# Patient Record
Sex: MALE | Race: WHITE | NOT HISPANIC OR LATINO | Employment: FULL TIME | URBAN - METROPOLITAN AREA
[De-identification: names, ages, dates, MRNs, and addresses within clinical notes are randomized per-mention and may not be internally consistent; named-entity substitution may affect disease eponyms.]

---

## 2018-01-04 ENCOUNTER — HISTORICAL (OUTPATIENT)
Dept: RESPIRATORY THERAPY | Facility: HOSPITAL | Age: 53
End: 2018-01-04

## 2018-01-06 ENCOUNTER — HISTORICAL (OUTPATIENT)
Dept: RESPIRATORY THERAPY | Facility: HOSPITAL | Age: 53
End: 2018-01-06

## 2018-01-08 ENCOUNTER — HISTORICAL (OUTPATIENT)
Dept: RADIOLOGY | Facility: HOSPITAL | Age: 53
End: 2018-01-08

## 2019-03-22 ENCOUNTER — HISTORICAL (OUTPATIENT)
Dept: RADIOLOGY | Facility: HOSPITAL | Age: 54
End: 2019-03-22

## 2019-10-18 ENCOUNTER — HISTORICAL (OUTPATIENT)
Dept: RADIOLOGY | Facility: HOSPITAL | Age: 54
End: 2019-10-18

## 2019-10-18 LAB
ABS NEUT (OLG): 4.4 X10(3)/MCL (ref 1.5–6.9)
ALBUMIN SERPL-MCNC: 3.7 GM/DL (ref 3.4–5)
ALBUMIN/GLOB SERPL: 1 RATIO
ALP SERPL-CCNC: 64 UNIT/L (ref 30–113)
ALT SERPL-CCNC: 85 UNIT/L (ref 10–45)
AST SERPL-CCNC: 48 UNIT/L (ref 15–37)
BILIRUB SERPL-MCNC: 0.5 MG/DL (ref 0.1–0.9)
BILIRUBIN DIRECT+TOT PNL SERPL-MCNC: 0.1 MG/DL (ref 0–0.3)
BILIRUBIN DIRECT+TOT PNL SERPL-MCNC: 0.4 MG/DL
BUN SERPL-MCNC: 18 MG/DL (ref 10–20)
CALCIUM SERPL-MCNC: 9.2 MG/DL (ref 8–10.5)
CHLORIDE SERPL-SCNC: 103 MMOL/L (ref 100–108)
CO2 SERPL-SCNC: 29 MMOL/L (ref 21–35)
CREAT SERPL-MCNC: 1.92 MG/DL (ref 0.7–1.3)
ERYTHROCYTE [DISTWIDTH] IN BLOOD BY AUTOMATED COUNT: 12.9 % (ref 11.5–17)
GLOBULIN SER-MCNC: 3.7 GM/DL
GLUCOSE SERPL-MCNC: 90 MG/DL (ref 75–116)
HCT VFR BLD AUTO: 49.1 % (ref 42–52)
HGB BLD-MCNC: 15.8 GM/DL (ref 14–18)
MCH RBC QN AUTO: 31 PG (ref 27–34)
MCHC RBC AUTO-ENTMCNC: 32 GM/DL (ref 31–36)
MCV RBC AUTO: 96 FL (ref 80–99)
PLATELET # BLD AUTO: 368 X10(3)/MCL (ref 140–400)
PMV BLD AUTO: 8.9 FL (ref 6.8–10)
POTASSIUM SERPL-SCNC: 4.6 MMOL/L (ref 3.6–5.2)
PROT SERPL-MCNC: 7.4 GM/DL (ref 6.4–8.2)
PSA SERPL-MCNC: 1.64 NG/ML (ref 0–4)
RBC # BLD AUTO: 5.09 X10(6)/MCL (ref 4.7–6.1)
SODIUM SERPL-SCNC: 139 MMOL/L (ref 135–145)
URATE SERPL-MCNC: 4 MG/DL (ref 2.6–7.2)
WBC # SPEC AUTO: 6.8 X10(3)/MCL (ref 4.5–11.5)

## 2020-05-08 ENCOUNTER — HISTORICAL (OUTPATIENT)
Dept: RADIOLOGY | Facility: HOSPITAL | Age: 55
End: 2020-05-08

## 2020-10-05 ENCOUNTER — HISTORICAL (OUTPATIENT)
Dept: RADIOLOGY | Facility: HOSPITAL | Age: 55
End: 2020-10-05

## 2020-10-08 ENCOUNTER — HISTORICAL (OUTPATIENT)
Dept: ADMINISTRATIVE | Facility: HOSPITAL | Age: 55
End: 2020-10-08

## 2020-11-03 ENCOUNTER — HISTORICAL (OUTPATIENT)
Dept: ADMINISTRATIVE | Facility: HOSPITAL | Age: 55
End: 2020-11-03

## 2020-12-04 ENCOUNTER — HISTORICAL (OUTPATIENT)
Dept: WOUND CARE | Facility: HOSPITAL | Age: 55
End: 2020-12-04

## 2020-12-29 ENCOUNTER — HISTORICAL (OUTPATIENT)
Dept: LAB | Facility: HOSPITAL | Age: 55
End: 2020-12-29

## 2020-12-29 LAB — C DIFF INTERP: NORMAL

## 2021-07-09 ENCOUNTER — HISTORICAL (OUTPATIENT)
Dept: LAB | Facility: HOSPITAL | Age: 56
End: 2021-07-09

## 2021-07-09 LAB
ABS NEUT (OLG): 2.8 X10(3)/MCL (ref 1.5–6.9)
ALBUMIN SERPL-MCNC: 4.1 GM/DL (ref 3.5–5)
ALBUMIN/GLOB SERPL: 1.3 RATIO (ref 1.1–2)
ALP SERPL-CCNC: 71 UNIT/L (ref 40–150)
ALT SERPL-CCNC: 25 UNIT/L (ref 0–55)
APPEARANCE, UA: CLEAR
AST SERPL-CCNC: 17 UNIT/L (ref 5–34)
BACTERIA SPEC CULT: ABNORMAL /HPF
BASOPHILS # BLD AUTO: 0.1 X10(3)/MCL (ref 0–0.1)
BASOPHILS NFR BLD AUTO: 1 % (ref 0–1)
BILIRUB SERPL-MCNC: 0.9 MG/DL
BILIRUB UR QL STRIP: NEGATIVE
BILIRUBIN DIRECT+TOT PNL SERPL-MCNC: 0.3 MG/DL (ref 0–0.5)
BILIRUBIN DIRECT+TOT PNL SERPL-MCNC: 0.6 MG/DL (ref 0–0.8)
BUN SERPL-MCNC: 13 MG/DL (ref 8.4–25.7)
CALCIUM SERPL-MCNC: 9.5 MG/DL (ref 8.4–10.2)
CHLORIDE SERPL-SCNC: 107 MMOL/L (ref 98–107)
CHOLEST SERPL-MCNC: 209 MG/DL
CHOLEST/HDLC SERPL: 4 {RATIO} (ref 0–5)
CO2 SERPL-SCNC: 29 MMOL/L (ref 22–29)
COLOR UR: YELLOW
CREAT SERPL-MCNC: 1.36 MG/DL (ref 0.73–1.18)
CREAT UR-MCNC: 184.4 MG/DL (ref 58–161)
DEPRECATED CALCIDIOL+CALCIFEROL SERPL-MC: 31.8 NG/ML (ref 30–80)
EOSINOPHIL # BLD AUTO: 0.2 X10(3)/MCL (ref 0–0.6)
EOSINOPHIL NFR BLD AUTO: 4 % (ref 0–5)
ERYTHROCYTE [DISTWIDTH] IN BLOOD BY AUTOMATED COUNT: 12.2 % (ref 11.5–17)
EST. AVERAGE GLUCOSE BLD GHB EST-MCNC: 96.8 MG/DL
GLOBULIN SER-MCNC: 3.2 GM/DL (ref 2.4–3.5)
GLUCOSE (UA): NEGATIVE MG/DL
GLUCOSE SERPL-MCNC: 103 MG/DL (ref 74–100)
HBA1C MFR BLD: 5 %
HCT VFR BLD AUTO: 44.8 % (ref 42–52)
HDLC SERPL-MCNC: 50 MG/DL (ref 35–60)
HGB BLD-MCNC: 15.4 GM/DL (ref 14–18)
HGB UR QL STRIP: ABNORMAL
IMM GRANULOCYTES # BLD AUTO: 0.01 10*3/UL (ref 0–0.02)
IMM GRANULOCYTES NFR BLD AUTO: 0.2 % (ref 0–0.43)
KETONES UR QL STRIP: NEGATIVE MG/DL
LDLC SERPL CALC-MCNC: 144 MG/DL (ref 50–140)
LEUKOCYTE ESTERASE UR QL STRIP: ABNORMAL
LYMPHOCYTES # BLD AUTO: 1.5 X10(3)/MCL (ref 0.5–4.1)
LYMPHOCYTES NFR BLD AUTO: 30 % (ref 15–40)
MCH RBC QN AUTO: 32 PG (ref 27–34)
MCHC RBC AUTO-ENTMCNC: 34 GM/DL (ref 31–36)
MCV RBC AUTO: 94 FL (ref 80–99)
MICROALBUMIN UR-MCNC: 11.2 UG/ML
MICROALBUMIN/CREAT RATIO PNL UR: 6.1 MG/GM CR (ref 0–30)
MONOCYTES # BLD AUTO: 0.4 X10(3)/MCL (ref 0–1.1)
MONOCYTES NFR BLD AUTO: 8 % (ref 4–12)
NEUTROPHILS # BLD AUTO: 2.8 X10(3)/MCL (ref 1.5–6.9)
NEUTROPHILS NFR BLD AUTO: 56 % (ref 43–75)
NITRITE UR QL STRIP: NEGATIVE
PH UR STRIP: 6 [PH] (ref 4.6–8)
PLATELET # BLD AUTO: 254 X10(3)/MCL (ref 140–400)
PMV BLD AUTO: 9.2 FL (ref 6.8–10)
POTASSIUM SERPL-SCNC: 4.2 MMOL/L (ref 3.5–5.1)
PROT SERPL-MCNC: 7.3 GM/DL (ref 6.4–8.3)
PROT UR QL STRIP: NEGATIVE MG/DL
PSA SERPL-MCNC: 1.04 NG/ML
RBC # BLD AUTO: 4.74 X10(6)/MCL (ref 4.7–6.1)
RBC #/AREA URNS HPF: ABNORMAL /HPF
SODIUM SERPL-SCNC: 142 MMOL/L (ref 136–145)
SP GR UR STRIP: 1.02 (ref 1–1.03)
SQUAMOUS EPITHELIAL, UA: ABNORMAL /LPF
TESTOST SERPL-MCNC: 415.68 NG/DL (ref 220.91–715.81)
TRIGL SERPL-MCNC: 76 MG/DL (ref 34–140)
TSH SERPL-ACNC: 2.55 UIU/ML (ref 0.35–4.94)
UROBILINOGEN UR STRIP-ACNC: 0.2 EU/DL
VLDLC SERPL CALC-MCNC: 15 MG/DL
WBC # SPEC AUTO: 5 X10(3)/MCL (ref 4.5–11.5)
WBC #/AREA URNS HPF: ABNORMAL /HPF

## 2021-07-11 LAB — FINAL CULTURE: NO GROWTH

## 2021-07-30 ENCOUNTER — HISTORICAL (OUTPATIENT)
Dept: LAB | Facility: HOSPITAL | Age: 56
End: 2021-07-30

## 2021-07-30 LAB — SARS-COV-2 RNA RESP QL NAA+PROBE: POSITIVE

## 2021-08-10 ENCOUNTER — HISTORICAL (OUTPATIENT)
Dept: RADIOLOGY | Facility: HOSPITAL | Age: 56
End: 2021-08-10

## 2022-04-10 ENCOUNTER — HISTORICAL (OUTPATIENT)
Dept: ADMINISTRATIVE | Facility: HOSPITAL | Age: 57
End: 2022-04-10

## 2022-04-27 VITALS
HEIGHT: 67 IN | BODY MASS INDEX: 27.68 KG/M2 | DIASTOLIC BLOOD PRESSURE: 93 MMHG | SYSTOLIC BLOOD PRESSURE: 139 MMHG | WEIGHT: 176.38 LBS
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
## 2022-05-03 NOTE — HISTORICAL [facility] C[facility]
This is a historical note converted from Darius. Formatting and pictures may have been removed.  Please reference Darius for original formatting and attached multimedia. Chief Complaint  2 week rt midfoot fracture, doi: 10/03/2020.. pt NWB with crutches. new x-ray today..cv  History of Present Illness  swelling has decreases  wound is doing well  Review of Systems  Review of Systems?  ?????Constitutional: ?No fever, No chills. ?  ?????Respiratory: ?No shortness of breath, No cough. ?  ?????Cardiovascular: ?No chest pain. ?  ?????Gastrointestinal: ?No nausea, No vomiting, No diarrhea, No constipation, No heartburn. ?  ?????Genitourinary: ?No dysuria, No hematuria. ?  ?????Hematology/Lymphatics: ?No bleeding tendency. ?  ?????Endocrine: ?No polyuria. ?  ?????Neurologic: ?Alert and oriented X4, No numbness, No tingling. ?  ?????Psychiatric: ?No anxiety, No depression. ?  ?????Integumentary: no abnormalities except as noted in history of present illness  Physical Exam  Vitals & Measurements  T:?36.9? ?C (Oral)? HR:?97(Peripheral)? BP:?116/78?  HT:?170.00?cm? WT:?80.000?kg? BMI:?27.68?  Left foot exam  Patient has decreasing?edema and ecchymosis about the?left forefoot and toes  He has tenderness to ?palpation across the third and fourth?phalanges and midfoot  Intact motor and sensation to the left foot and toes  Brisk capillary refill to all the digits of the foot  ?no tenderness palpation of the ankle structures  ?   X-rays taken today  Fracture?of the base of the proximal phalanx of the third toe and the distal aspect of the proximal phalanx of the fourth toe  ?   Diagnosis:?Left foot third and fourth phalanx fractures with lis franc injury  ?   plan  continue with local wound care  CAM walker  TTWB  start some desensitization efforts  ?   repeat x-rays in one month  Assessment/Plan  1.?Closed fracture of tarsal and metatarsal bones?Z73.049R   Problem List/Past Medical History  Ongoing  Closed fracture of tarsal and  metatarsal bones  Depression  GERD - Gastro-esophageal reflux disease  HTN - Hypertension  Hypothyroidism  Historical  No qualifying data  Procedure/Surgical History  Application of short leg splint (calf to foot) (10/03/2020)  Immobilization of Right Lower Leg using Splint (10/03/2020)  Mastectomy for gynecomastia. (04/25/2012)  Other unilateral subcutaneous mammectomy (04/25/2012)  Breast mass, left   Medications  Aspir-Low 81 mg oral delayed release tablet, 81 mg= 1 tab(s), Oral, Daily  losartan 50 mg oral tablet, 50 mg= 1 tab(s), Oral, Daily  omeprazole, 40 mg, Daily  Synthroid 25 mcg (0.025 mg) oral tablet, 25 mcg= 1 tab(s), Oral, Daily  Tylenol, 500 mg= 1 tab(s), Oral, q4hr, PRN  Wellbutrin  mg/24 hours oral tablet, extended release, Oral, Daily  Allergies  No Known Allergies  Social History  Abuse/Neglect  No, 11/03/2020  Alcohol - Denies Alcohol Use, 04/22/2012  Tobacco - Low Risk, 04/22/2012  Never (less than 100 in lifetime), N/A, 11/03/2020  Family History  Diabetes mellitus type 2: Mother.  Primary malignant neoplasm of colon: Father.  Primary malignant neoplasm of lung: Grandfather.  Immunizations  Vaccine Date Status   tetanus/diphtheria/pertussis, acel(Tdap) 10/03/2020 Given   Health Maintenance  Health Maintenance  ???Pending?(in the next year)  ??? ??OverDue  ??? ? ? ?Smoking Cessation due??01/01/20??Variable frequency  ??? ??Due?  ??? ? ? ?Influenza Vaccine due??10/01/20??and every 1??day(s)  ??? ? ? ?ADL Screening due??11/11/20??and every 1??year(s)  ??? ? ? ?Colorectal Screening due??11/11/20??Unknown Frequency  ??? ? ? ?Hypertension Management-Education due??11/11/20??and every 1??year(s)  ??? ? ? ?Lipid Screening due??11/11/20??Unknown Frequency  ??? ? ? ?Zoster Vaccine due??11/11/20??Unknown Frequency  ??? ??Due In Future?  ??? ? ? ?Obesity Screening not due until??01/01/21??and every 1??year(s)  ??? ? ? ?Alcohol Misuse Screening not due until??01/02/21??and every 1??year(s)  ??? ? ?  ?Aspirin Therapy for CVD Prevention not due until??10/03/21??and every 1??year(s)  ??? ? ? ?Hypertension Management-BMP not due until??10/14/21??and every 1??year(s)  ??? ? ? ?Blood Pressure Screening not due until??11/03/21??and every 1??year(s)  ??? ? ? ?Body Mass Index Check not due until??11/03/21??and every 1??year(s)  ??? ? ? ?Hypertension Management-Blood Pressure not due until??11/03/21??and every 1??year(s)  ???Satisfied?(in the past 1 year)  ??? ??Satisfied?  ??? ? ? ?Alcohol Misuse Screening on??10/08/20.??Satisfied by Susu Irene LPN  ??? ? ? ?Aspirin Therapy for CVD Prevention on??10/03/20.  ??? ? ? ?Blood Pressure Screening on??11/03/20.??Satisfied by Susu Irene LPN  ??? ? ? ?Body Mass Index Check on??11/03/20.??Satisfied by Susu Irene LPN  ??? ? ? ?Depression Screening on??10/08/20.??Satisfied by Susu Irene LPN  ??? ? ? ?Diabetes Screening on??10/14/20.??Satisfied by Maria Dolores Castillo  ??? ? ? ?Hypertension Management-Blood Pressure on??11/03/20.??Satisfied by Susu Irene LPN  ??? ? ? ?Influenza Vaccine on??10/13/20.??Satisfied by Joan Turner RN  ??? ? ? ?Obesity Screening on??11/03/20.??Satisfied by Susu Irene LPN  ??? ? ? ?Tetanus Vaccine on??10/03/20.??Satisfied by Rosey Ruby RN  ?      Diagnosis?correction: Right?foot third and fourth phalanx fractures with lis franc injury

## 2022-12-05 DIAGNOSIS — R51.9 HEAD ACHE: Primary | ICD-10-CM

## 2022-12-06 ENCOUNTER — HOSPITAL ENCOUNTER (OUTPATIENT)
Dept: RADIOLOGY | Facility: HOSPITAL | Age: 57
Discharge: HOME | End: 2022-12-06
Attending: INTERNAL MEDICINE
Payer: COMMERCIAL

## 2022-12-06 DIAGNOSIS — R51.9 HEAD ACHE: ICD-10-CM

## 2022-12-06 PROCEDURE — 70450 CT HEAD/BRAIN W/O DYE: CPT | Mod: TC

## 2022-12-13 DIAGNOSIS — R51.0 ORTHOSTATIC HEADACHE: Primary | ICD-10-CM

## 2022-12-27 ENCOUNTER — HOSPITAL ENCOUNTER (OUTPATIENT)
Dept: RADIOLOGY | Facility: HOSPITAL | Age: 57
Discharge: HOME | End: 2022-12-27
Attending: INTERNAL MEDICINE
Payer: COMMERCIAL

## 2022-12-27 DIAGNOSIS — R05.1 ACUTE COUGH: ICD-10-CM

## 2022-12-27 PROCEDURE — 71046 X-RAY EXAM CHEST 2 VIEWS: CPT | Mod: TC

## 2023-08-22 ENCOUNTER — HOSPITAL ENCOUNTER (OUTPATIENT)
Dept: RADIOLOGY | Facility: HOSPITAL | Age: 58
Discharge: HOME | End: 2023-08-22
Attending: INTERNAL MEDICINE
Payer: COMMERCIAL

## 2023-08-22 DIAGNOSIS — R07.9 CHEST PAIN: ICD-10-CM

## 2023-08-22 DIAGNOSIS — R50.9 FEVER, UNSPECIFIED: Primary | ICD-10-CM

## 2023-08-22 PROCEDURE — 71046 X-RAY EXAM CHEST 2 VIEWS: CPT | Mod: TC

## 2023-08-23 ENCOUNTER — HOSPITAL ENCOUNTER (OUTPATIENT)
Dept: RADIOLOGY | Facility: HOSPITAL | Age: 58
Discharge: HOME | End: 2023-08-23
Attending: INTERNAL MEDICINE
Payer: COMMERCIAL

## 2023-08-23 DIAGNOSIS — R50.9 FEVER, UNSPECIFIED: ICD-10-CM

## 2023-08-23 PROCEDURE — 76770 US EXAM ABDO BACK WALL COMP: CPT | Mod: TC

## 2023-09-19 ENCOUNTER — LAB VISIT (OUTPATIENT)
Dept: LAB | Facility: HOSPITAL | Age: 58
End: 2023-09-19
Attending: INTERNAL MEDICINE
Payer: COMMERCIAL

## 2023-09-19 DIAGNOSIS — I10 HYPERTENSION, UNSPECIFIED TYPE: ICD-10-CM

## 2023-09-19 DIAGNOSIS — N39.0 URINARY TRACT INFECTION, SITE NOT SPECIFIED: ICD-10-CM

## 2023-09-19 DIAGNOSIS — M06.9 RHEUMATOID ARTHRITIS, INVOLVING UNSPECIFIED SITE, UNSPECIFIED WHETHER RHEUMATOID FACTOR PRESENT: Primary | ICD-10-CM

## 2023-09-19 DIAGNOSIS — R79.82 ELEVATED C-REACTIVE PROTEIN (CRP): ICD-10-CM

## 2023-09-19 LAB
APPEARANCE UR: CLEAR
BACTERIA #/AREA URNS AUTO: NORMAL /HPF
BASOPHILS # BLD AUTO: 0.05 X10(3)/MCL
BASOPHILS NFR BLD AUTO: 1 %
BILIRUB UR QL STRIP.AUTO: NEGATIVE
COLOR UR: YELLOW
CRP SERPL HS-MCNC: 1.53 MG/L
EOSINOPHIL # BLD AUTO: 0.21 X10(3)/MCL (ref 0–0.9)
EOSINOPHIL NFR BLD AUTO: 4.1 %
ERYTHROCYTE [DISTWIDTH] IN BLOOD BY AUTOMATED COUNT: 12.3 % (ref 11.5–17)
ERYTHROCYTE [SEDIMENTATION RATE] IN BLOOD: 23 MM/HR (ref 0–15)
GLUCOSE UR QL STRIP.AUTO: NEGATIVE
HCT VFR BLD AUTO: 41 % (ref 42–52)
HGB BLD-MCNC: 14.3 G/DL (ref 14–18)
IMM GRANULOCYTES # BLD AUTO: 0.01 X10(3)/MCL (ref 0–0.04)
IMM GRANULOCYTES NFR BLD AUTO: 0.2 %
KETONES UR QL STRIP.AUTO: NEGATIVE
LEUKOCYTE ESTERASE UR QL STRIP.AUTO: NEGATIVE
LYMPHOCYTES # BLD AUTO: 1.31 X10(3)/MCL (ref 0.6–4.6)
LYMPHOCYTES NFR BLD AUTO: 25.4 %
MCH RBC QN AUTO: 32.6 PG (ref 27–31)
MCHC RBC AUTO-ENTMCNC: 34.9 G/DL (ref 33–36)
MCV RBC AUTO: 93.6 FL (ref 80–94)
MONOCYTES # BLD AUTO: 0.48 X10(3)/MCL (ref 0.1–1.3)
MONOCYTES NFR BLD AUTO: 9.3 %
NEUTROPHILS # BLD AUTO: 3.1 X10(3)/MCL (ref 2.1–9.2)
NEUTROPHILS NFR BLD AUTO: 60 %
NITRITE UR QL STRIP.AUTO: NEGATIVE
PH UR STRIP.AUTO: 5.5 [PH]
PLATELET # BLD AUTO: 224 X10(3)/MCL (ref 130–400)
PMV BLD AUTO: 9.1 FL (ref 7.4–10.4)
PROT UR QL STRIP.AUTO: NEGATIVE
RBC # BLD AUTO: 4.38 X10(6)/MCL (ref 4.7–6.1)
RBC #/AREA URNS AUTO: NORMAL /HPF
RBC UR QL AUTO: ABNORMAL
SP GR UR STRIP.AUTO: 1.02 (ref 1–1.03)
SQUAMOUS #/AREA URNS AUTO: NORMAL /HPF
UROBILINOGEN UR STRIP-ACNC: 0.2
WBC # SPEC AUTO: 5.16 X10(3)/MCL (ref 4.5–11.5)
WBC #/AREA URNS AUTO: NORMAL /HPF

## 2023-09-19 PROCEDURE — 81001 URINALYSIS AUTO W/SCOPE: CPT

## 2023-09-19 PROCEDURE — 86141 C-REACTIVE PROTEIN HS: CPT

## 2023-09-19 PROCEDURE — 36415 COLL VENOUS BLD VENIPUNCTURE: CPT

## 2023-09-19 PROCEDURE — 85025 COMPLETE CBC W/AUTO DIFF WBC: CPT

## 2023-09-19 PROCEDURE — 85652 RBC SED RATE AUTOMATED: CPT

## 2024-01-26 ENCOUNTER — LAB VISIT (OUTPATIENT)
Dept: LAB | Facility: HOSPITAL | Age: 59
End: 2024-01-26
Attending: INTERNAL MEDICINE
Payer: COMMERCIAL

## 2024-01-26 DIAGNOSIS — E03.9 HYPOTHYROIDISM, UNSPECIFIED TYPE: Primary | ICD-10-CM

## 2024-01-26 DIAGNOSIS — E78.00 PURE HYPERCHOLESTEROLEMIA: ICD-10-CM

## 2024-01-26 DIAGNOSIS — E55.9 VITAMIN D DEFICIENCY: ICD-10-CM

## 2024-01-26 DIAGNOSIS — E11.9 DIABETES MELLITUS WITHOUT COMPLICATION: ICD-10-CM

## 2024-01-26 DIAGNOSIS — I10 HYPERTENSION, UNSPECIFIED TYPE: ICD-10-CM

## 2024-01-26 LAB
ALBUMIN SERPL-MCNC: 4.3 G/DL (ref 3.5–5)
ALBUMIN/GLOB SERPL: 1.2 RATIO (ref 1.1–2)
ALP SERPL-CCNC: 68 UNIT/L (ref 40–150)
ALT SERPL-CCNC: 26 UNIT/L (ref 0–55)
AST SERPL-CCNC: 17 UNIT/L (ref 5–34)
BASOPHILS # BLD AUTO: 0.05 X10(3)/MCL
BASOPHILS NFR BLD AUTO: 0.9 %
BILIRUB SERPL-MCNC: 0.9 MG/DL
BUN SERPL-MCNC: 16 MG/DL (ref 8.4–25.7)
CALCIUM SERPL-MCNC: 9.9 MG/DL (ref 8.4–10.2)
CHLORIDE SERPL-SCNC: 105 MMOL/L (ref 98–107)
CHOLEST SERPL-MCNC: 240 MG/DL
CHOLEST/HDLC SERPL: 5 {RATIO} (ref 0–5)
CO2 SERPL-SCNC: 27 MMOL/L (ref 22–29)
CREAT SERPL-MCNC: 1.63 MG/DL (ref 0.73–1.18)
DEPRECATED CALCIDIOL+CALCIFEROL SERPL-MC: 76.6 NG/ML (ref 30–80)
EOSINOPHIL # BLD AUTO: 0.19 X10(3)/MCL (ref 0–0.9)
EOSINOPHIL NFR BLD AUTO: 3.6 %
ERYTHROCYTE [DISTWIDTH] IN BLOOD BY AUTOMATED COUNT: 12.4 % (ref 11.5–17)
EST. AVERAGE GLUCOSE BLD GHB EST-MCNC: 96.8 MG/DL
GFR SERPLBLD CREATININE-BSD FMLA CKD-EPI: 49 MLS/MIN/1.73/M2
GLOBULIN SER-MCNC: 3.6 GM/DL (ref 2.4–3.5)
GLUCOSE SERPL-MCNC: 103 MG/DL (ref 74–100)
HBA1C MFR BLD: 5 %
HCT VFR BLD AUTO: 47 % (ref 42–52)
HDLC SERPL-MCNC: 46 MG/DL (ref 35–60)
HGB BLD-MCNC: 16.5 G/DL (ref 14–18)
IMM GRANULOCYTES # BLD AUTO: 0.01 X10(3)/MCL (ref 0–0.04)
IMM GRANULOCYTES NFR BLD AUTO: 0.2 %
LDLC SERPL CALC-MCNC: 177 MG/DL (ref 50–140)
LYMPHOCYTES # BLD AUTO: 1.69 X10(3)/MCL (ref 0.6–4.6)
LYMPHOCYTES NFR BLD AUTO: 31.8 %
MCH RBC QN AUTO: 33.2 PG (ref 27–31)
MCHC RBC AUTO-ENTMCNC: 35.1 G/DL (ref 33–36)
MCV RBC AUTO: 94.6 FL (ref 80–94)
MONOCYTES # BLD AUTO: 0.54 X10(3)/MCL (ref 0.1–1.3)
MONOCYTES NFR BLD AUTO: 10.2 %
NEUTROPHILS # BLD AUTO: 2.84 X10(3)/MCL (ref 2.1–9.2)
NEUTROPHILS NFR BLD AUTO: 53.3 %
PLATELET # BLD AUTO: 245 X10(3)/MCL (ref 130–400)
PMV BLD AUTO: 9.2 FL (ref 7.4–10.4)
POTASSIUM SERPL-SCNC: 4.6 MMOL/L (ref 3.5–5.1)
PROT SERPL-MCNC: 7.9 GM/DL (ref 6.4–8.3)
RBC # BLD AUTO: 4.97 X10(6)/MCL (ref 4.7–6.1)
SODIUM SERPL-SCNC: 139 MMOL/L (ref 136–145)
TRIGL SERPL-MCNC: 87 MG/DL (ref 34–140)
TSH SERPL-ACNC: 2.99 UIU/ML (ref 0.35–4.94)
VLDLC SERPL CALC-MCNC: 17 MG/DL
WBC # SPEC AUTO: 5.32 X10(3)/MCL (ref 4.5–11.5)

## 2024-01-26 PROCEDURE — 80053 COMPREHEN METABOLIC PANEL: CPT

## 2024-01-26 PROCEDURE — 84443 ASSAY THYROID STIM HORMONE: CPT

## 2024-01-26 PROCEDURE — 82306 VITAMIN D 25 HYDROXY: CPT

## 2024-01-26 PROCEDURE — 80061 LIPID PANEL: CPT

## 2024-01-26 PROCEDURE — 85025 COMPLETE CBC W/AUTO DIFF WBC: CPT

## 2024-01-26 PROCEDURE — 36415 COLL VENOUS BLD VENIPUNCTURE: CPT

## 2024-01-26 PROCEDURE — 83036 HEMOGLOBIN GLYCOSYLATED A1C: CPT

## 2024-02-23 ENCOUNTER — LAB VISIT (OUTPATIENT)
Dept: LAB | Facility: HOSPITAL | Age: 59
End: 2024-02-23
Attending: INTERNAL MEDICINE
Payer: COMMERCIAL

## 2024-02-23 DIAGNOSIS — E63.9 NUTRITIONAL AND METABOLIC CARDIOMYOPATHY: ICD-10-CM

## 2024-02-23 DIAGNOSIS — N39.0 URINARY TRACT INFECTION, SITE NOT SPECIFIED: ICD-10-CM

## 2024-02-23 DIAGNOSIS — I43 NUTRITIONAL AND METABOLIC CARDIOMYOPATHY: ICD-10-CM

## 2024-02-23 DIAGNOSIS — R80.9 PROTEINURIA, UNSPECIFIED TYPE: Primary | ICD-10-CM

## 2024-02-23 DIAGNOSIS — E88.9 NUTRITIONAL AND METABOLIC CARDIOMYOPATHY: ICD-10-CM

## 2024-02-23 LAB
ANION GAP SERPL CALC-SCNC: 7 MEQ/L
APPEARANCE UR: CLEAR
BACTERIA #/AREA URNS AUTO: ABNORMAL /HPF
BILIRUB UR QL STRIP.AUTO: NEGATIVE
BUN SERPL-MCNC: 17 MG/DL (ref 8.4–25.7)
CALCIUM SERPL-MCNC: 9.6 MG/DL (ref 8.4–10.2)
CHLORIDE SERPL-SCNC: 108 MMOL/L (ref 98–107)
CO2 SERPL-SCNC: 25 MMOL/L (ref 22–29)
COLOR UR AUTO: YELLOW
CREAT SERPL-MCNC: 1.53 MG/DL (ref 0.73–1.18)
CREAT UR-MCNC: 268.3 MG/DL (ref 63–166)
CREAT/UREA NIT SERPL: 11
GFR SERPLBLD CREATININE-BSD FMLA CKD-EPI: 53 MLS/MIN/1.73/M2
GLUCOSE SERPL-MCNC: 95 MG/DL (ref 74–100)
GLUCOSE UR QL STRIP.AUTO: NEGATIVE
KETONES UR QL STRIP.AUTO: NEGATIVE
LEUKOCYTE ESTERASE UR QL STRIP.AUTO: NEGATIVE
MICROALBUMIN UR-MCNC: 23.6 UG/ML
MICROALBUMIN/CREAT RATIO PNL UR: 8.8 MG/GM CR (ref 0–30)
MUCOUS THREADS URNS QL MICRO: ABNORMAL /LPF
NITRITE UR QL STRIP.AUTO: NEGATIVE
PH UR STRIP.AUTO: 5.5 [PH]
POTASSIUM SERPL-SCNC: 4 MMOL/L (ref 3.5–5.1)
PROT UR QL STRIP.AUTO: NEGATIVE
RBC #/AREA URNS AUTO: ABNORMAL /HPF
RBC UR QL AUTO: ABNORMAL
SODIUM SERPL-SCNC: 140 MMOL/L (ref 136–145)
SP GR UR STRIP.AUTO: >=1.03 (ref 1–1.03)
SPERM URNS QL MICRO: ABNORMAL /HPF
SQUAMOUS #/AREA URNS AUTO: ABNORMAL /HPF
UROBILINOGEN UR STRIP-ACNC: 0.2
WBC #/AREA URNS AUTO: ABNORMAL /HPF

## 2024-02-23 PROCEDURE — 36415 COLL VENOUS BLD VENIPUNCTURE: CPT

## 2024-02-23 PROCEDURE — 81003 URINALYSIS AUTO W/O SCOPE: CPT

## 2024-02-23 PROCEDURE — 82043 UR ALBUMIN QUANTITATIVE: CPT

## 2024-02-23 PROCEDURE — 80048 BASIC METABOLIC PNL TOTAL CA: CPT

## 2024-07-26 ENCOUNTER — LAB VISIT (OUTPATIENT)
Dept: LAB | Facility: HOSPITAL | Age: 59
End: 2024-07-26
Attending: INTERNAL MEDICINE
Payer: COMMERCIAL

## 2024-07-26 DIAGNOSIS — I10 ESSENTIAL HYPERTENSION, MALIGNANT: Primary | ICD-10-CM

## 2024-07-26 DIAGNOSIS — N18.9 CHRONIC KIDNEY DISEASE, UNSPECIFIED: ICD-10-CM

## 2024-07-26 LAB
ALBUMIN SERPL-MCNC: 4 G/DL (ref 3.5–5)
BILIRUB UR QL STRIP.AUTO: NEGATIVE
BUN SERPL-MCNC: 17 MG/DL (ref 8.4–25.7)
CALCIUM SERPL-MCNC: 9.8 MG/DL (ref 8.4–10.2)
CHLORIDE SERPL-SCNC: 109 MMOL/L (ref 98–107)
CLARITY UR: CLEAR
CO2 SERPL-SCNC: 24 MMOL/L (ref 22–29)
COLOR UR AUTO: YELLOW
CREAT SERPL-MCNC: 1.49 MG/DL (ref 0.73–1.18)
CREAT UR-MCNC: 112.4 MG/DL (ref 63–166)
ERYTHROCYTE [DISTWIDTH] IN BLOOD BY AUTOMATED COUNT: 12.8 % (ref 11.5–17)
GFR SERPLBLD CREATININE-BSD FMLA CKD-EPI: 55 ML/MIN/1.73/M2
GLUCOSE SERPL-MCNC: 95 MG/DL (ref 74–100)
GLUCOSE UR QL STRIP: NEGATIVE
HCT VFR BLD AUTO: 43.2 % (ref 42–52)
HGB BLD-MCNC: 15.2 G/DL (ref 14–18)
HGB UR QL STRIP: NEGATIVE
KETONES UR QL STRIP: NEGATIVE
LEUKOCYTE ESTERASE UR QL STRIP: NEGATIVE
MCH RBC QN AUTO: 32.4 PG (ref 27–31)
MCHC RBC AUTO-ENTMCNC: 35.2 G/DL (ref 33–36)
MCV RBC AUTO: 92.1 FL (ref 80–94)
MICROALBUMIN UR-MCNC: <5 UG/ML
MICROALBUMIN/CREAT RATIO PNL UR: NORMAL
NITRITE UR QL STRIP: NEGATIVE
NRBC BLD AUTO-RTO: 0 %
PH UR STRIP: 5 [PH]
PHOSPHATE SERPL-MCNC: 3.9 MG/DL (ref 2.3–4.7)
PLATELET # BLD AUTO: 221 X10(3)/MCL (ref 130–400)
PMV BLD AUTO: 9.3 FL (ref 7.4–10.4)
POTASSIUM SERPL-SCNC: 4.3 MMOL/L (ref 3.5–5.1)
PROT UR QL STRIP: NEGATIVE
PROT UR STRIP-MCNC: 7.2 MG/DL
RBC # BLD AUTO: 4.69 X10(6)/MCL (ref 4.7–6.1)
SODIUM SERPL-SCNC: 141 MMOL/L (ref 136–145)
SP GR UR STRIP.AUTO: 1.02 (ref 1–1.03)
UROBILINOGEN UR STRIP-ACNC: 0.2
WBC # BLD AUTO: 5.19 X10(3)/MCL (ref 4.5–11.5)

## 2024-07-26 PROCEDURE — 85027 COMPLETE CBC AUTOMATED: CPT

## 2024-07-26 PROCEDURE — 81003 URINALYSIS AUTO W/O SCOPE: CPT

## 2024-07-26 PROCEDURE — 84156 ASSAY OF PROTEIN URINE: CPT

## 2024-07-26 PROCEDURE — 82043 UR ALBUMIN QUANTITATIVE: CPT

## 2024-07-26 PROCEDURE — 82570 ASSAY OF URINE CREATININE: CPT

## 2024-07-26 PROCEDURE — 80069 RENAL FUNCTION PANEL: CPT

## 2024-07-26 PROCEDURE — 36415 COLL VENOUS BLD VENIPUNCTURE: CPT

## 2024-11-15 ENCOUNTER — LAB VISIT (OUTPATIENT)
Dept: LAB | Facility: HOSPITAL | Age: 59
End: 2024-11-15
Attending: INTERNAL MEDICINE
Payer: COMMERCIAL

## 2024-11-15 DIAGNOSIS — I10 ESSENTIAL HYPERTENSION, MALIGNANT: Primary | ICD-10-CM

## 2024-11-15 LAB
ALBUMIN SERPL-MCNC: 3.8 G/DL (ref 3.5–5)
BASOPHILS # BLD AUTO: 0.04 X10(3)/MCL
BASOPHILS NFR BLD AUTO: 0.7 %
BILIRUB UR QL STRIP.AUTO: NEGATIVE
BUN SERPL-MCNC: 14 MG/DL (ref 8.4–25.7)
CALCIUM SERPL-MCNC: 9.5 MG/DL (ref 8.4–10.2)
CHLORIDE SERPL-SCNC: 108 MMOL/L (ref 98–107)
CLARITY UR: CLEAR
CO2 SERPL-SCNC: 23 MMOL/L (ref 22–29)
COLOR UR AUTO: YELLOW
CREAT SERPL-MCNC: 1.53 MG/DL (ref 0.72–1.25)
CREAT UR-MCNC: 205.9 MG/DL (ref 63–166)
EOSINOPHIL # BLD AUTO: 0.23 X10(3)/MCL (ref 0–0.9)
EOSINOPHIL NFR BLD AUTO: 4.1 %
ERYTHROCYTE [DISTWIDTH] IN BLOOD BY AUTOMATED COUNT: 12.2 % (ref 11.5–17)
GFR SERPLBLD CREATININE-BSD FMLA CKD-EPI: 53 ML/MIN/1.73/M2
GLUCOSE SERPL-MCNC: 122 MG/DL (ref 74–100)
GLUCOSE UR QL STRIP: NEGATIVE
HCT VFR BLD AUTO: 40.5 % (ref 42–52)
HGB BLD-MCNC: 14.3 G/DL (ref 14–18)
HGB UR QL STRIP: NEGATIVE
IMM GRANULOCYTES # BLD AUTO: 0.01 X10(3)/MCL (ref 0–0.04)
IMM GRANULOCYTES NFR BLD AUTO: 0.2 %
KETONES UR QL STRIP: NEGATIVE
LEUKOCYTE ESTERASE UR QL STRIP: NEGATIVE
LYMPHOCYTES # BLD AUTO: 1.71 X10(3)/MCL (ref 0.6–4.6)
LYMPHOCYTES NFR BLD AUTO: 30.6 %
MCH RBC QN AUTO: 32.9 PG (ref 27–31)
MCHC RBC AUTO-ENTMCNC: 35.3 G/DL (ref 33–36)
MCV RBC AUTO: 93.3 FL (ref 80–94)
MICROALBUMIN UR-MCNC: 11.6 UG/ML
MICROALBUMIN/CREAT RATIO PNL UR: 5.6 MG/GM CR (ref 0–30)
MONOCYTES # BLD AUTO: 0.48 X10(3)/MCL (ref 0.1–1.3)
MONOCYTES NFR BLD AUTO: 8.6 %
NEUTROPHILS # BLD AUTO: 3.11 X10(3)/MCL (ref 2.1–9.2)
NEUTROPHILS NFR BLD AUTO: 55.8 %
NITRITE UR QL STRIP: NEGATIVE
PH UR STRIP: 6 [PH]
PHOSPHATE SERPL-MCNC: 3.1 MG/DL (ref 2.3–4.7)
PLATELET # BLD AUTO: 243 X10(3)/MCL (ref 130–400)
PMV BLD AUTO: 9 FL (ref 7.4–10.4)
POTASSIUM SERPL-SCNC: 4.2 MMOL/L (ref 3.5–5.1)
PROT UR QL STRIP: NEGATIVE
PROT UR STRIP-MCNC: 9.2 MG/DL
RBC # BLD AUTO: 4.34 X10(6)/MCL (ref 4.7–6.1)
SODIUM SERPL-SCNC: 140 MMOL/L (ref 136–145)
SP GR UR STRIP.AUTO: 1.02 (ref 1–1.03)
UROBILINOGEN UR STRIP-ACNC: 0.2
WBC # BLD AUTO: 5.58 X10(3)/MCL (ref 4.5–11.5)

## 2024-11-15 PROCEDURE — 36415 COLL VENOUS BLD VENIPUNCTURE: CPT

## 2024-11-15 PROCEDURE — 84156 ASSAY OF PROTEIN URINE: CPT

## 2024-11-15 PROCEDURE — 80069 RENAL FUNCTION PANEL: CPT

## 2024-11-15 PROCEDURE — 82043 UR ALBUMIN QUANTITATIVE: CPT

## 2024-11-15 PROCEDURE — 85025 COMPLETE CBC W/AUTO DIFF WBC: CPT

## 2024-11-15 PROCEDURE — 81003 URINALYSIS AUTO W/O SCOPE: CPT

## 2024-12-23 ENCOUNTER — LAB VISIT (OUTPATIENT)
Dept: LAB | Facility: HOSPITAL | Age: 59
End: 2024-12-23
Attending: INTERNAL MEDICINE
Payer: COMMERCIAL

## 2024-12-23 DIAGNOSIS — Z13.220 SCREENING FOR LIPOID DISORDERS: ICD-10-CM

## 2024-12-23 DIAGNOSIS — Z13.29 SCREENING FOR THYROID DISORDER: ICD-10-CM

## 2024-12-23 DIAGNOSIS — Z13.0 SCREENING FOR IRON DEFICIENCY ANEMIA: Primary | ICD-10-CM

## 2024-12-23 DIAGNOSIS — E55.9 AVITAMINOSIS D: ICD-10-CM

## 2024-12-23 DIAGNOSIS — Z13.6 SCREENING FOR ISCHEMIC HEART DISEASE: ICD-10-CM

## 2024-12-23 DIAGNOSIS — Z13.1 SCREENING FOR DIABETES MELLITUS: ICD-10-CM

## 2024-12-23 LAB
25(OH)D3+25(OH)D2 SERPL-MCNC: 103 NG/ML (ref 30–80)
ALBUMIN SERPL-MCNC: 4.2 G/DL (ref 3.5–5)
ALBUMIN/GLOB SERPL: 1.2 RATIO (ref 1.1–2)
ALP SERPL-CCNC: 70 UNIT/L (ref 40–150)
ALT SERPL-CCNC: 28 UNIT/L (ref 0–55)
ANION GAP SERPL CALC-SCNC: 5 MEQ/L
AST SERPL-CCNC: 19 UNIT/L (ref 5–34)
BASOPHILS # BLD AUTO: 0.03 X10(3)/MCL
BASOPHILS NFR BLD AUTO: 0.6 %
BILIRUB SERPL-MCNC: 1.1 MG/DL
BUN SERPL-MCNC: 18 MG/DL (ref 8.4–25.7)
CALCIUM SERPL-MCNC: 9.6 MG/DL (ref 8.4–10.2)
CHLORIDE SERPL-SCNC: 107 MMOL/L (ref 98–107)
CHOLEST SERPL-MCNC: 226 MG/DL
CHOLEST/HDLC SERPL: 4 {RATIO} (ref 0–5)
CO2 SERPL-SCNC: 27 MMOL/L (ref 22–29)
CREAT SERPL-MCNC: 1.53 MG/DL (ref 0.72–1.25)
CREAT/UREA NIT SERPL: 12
EOSINOPHIL # BLD AUTO: 0.22 X10(3)/MCL (ref 0–0.9)
EOSINOPHIL NFR BLD AUTO: 4.4 %
ERYTHROCYTE [DISTWIDTH] IN BLOOD BY AUTOMATED COUNT: 12.3 % (ref 11.5–17)
EST. AVERAGE GLUCOSE BLD GHB EST-MCNC: 99.7 MG/DL
GFR SERPLBLD CREATININE-BSD FMLA CKD-EPI: 53 ML/MIN/1.73/M2
GLOBULIN SER-MCNC: 3.4 GM/DL (ref 2.4–3.5)
GLUCOSE SERPL-MCNC: 112 MG/DL (ref 74–100)
HBA1C MFR BLD: 5.1 %
HCT VFR BLD AUTO: 44.9 % (ref 42–52)
HDLC SERPL-MCNC: 52 MG/DL (ref 35–60)
HGB BLD-MCNC: 15.6 G/DL (ref 14–18)
IMM GRANULOCYTES # BLD AUTO: 0.01 X10(3)/MCL (ref 0–0.04)
IMM GRANULOCYTES NFR BLD AUTO: 0.2 %
LDLC SERPL CALC-MCNC: 156 MG/DL (ref 50–140)
LYMPHOCYTES # BLD AUTO: 1.51 X10(3)/MCL (ref 0.6–4.6)
LYMPHOCYTES NFR BLD AUTO: 30.5 %
MCH RBC QN AUTO: 32.6 PG (ref 27–31)
MCHC RBC AUTO-ENTMCNC: 34.7 G/DL (ref 33–36)
MCV RBC AUTO: 93.9 FL (ref 80–94)
MONOCYTES # BLD AUTO: 0.35 X10(3)/MCL (ref 0.1–1.3)
MONOCYTES NFR BLD AUTO: 7.1 %
NEUTROPHILS # BLD AUTO: 2.83 X10(3)/MCL (ref 2.1–9.2)
NEUTROPHILS NFR BLD AUTO: 57.2 %
PLATELET # BLD AUTO: 219 X10(3)/MCL (ref 130–400)
PMV BLD AUTO: 8.9 FL (ref 7.4–10.4)
POTASSIUM SERPL-SCNC: 4.1 MMOL/L (ref 3.5–5.1)
PROT SERPL-MCNC: 7.6 GM/DL (ref 6.4–8.3)
RBC # BLD AUTO: 4.78 X10(6)/MCL (ref 4.7–6.1)
SODIUM SERPL-SCNC: 139 MMOL/L (ref 136–145)
TRIGL SERPL-MCNC: 91 MG/DL (ref 34–140)
TSH SERPL-ACNC: 2.06 UIU/ML (ref 0.35–4.94)
VLDLC SERPL CALC-MCNC: 18 MG/DL
WBC # BLD AUTO: 4.95 X10(3)/MCL (ref 4.5–11.5)

## 2024-12-23 PROCEDURE — 84443 ASSAY THYROID STIM HORMONE: CPT

## 2024-12-23 PROCEDURE — 83036 HEMOGLOBIN GLYCOSYLATED A1C: CPT

## 2024-12-23 PROCEDURE — 85025 COMPLETE CBC W/AUTO DIFF WBC: CPT

## 2024-12-23 PROCEDURE — 80053 COMPREHEN METABOLIC PANEL: CPT

## 2024-12-23 PROCEDURE — 82306 VITAMIN D 25 HYDROXY: CPT

## 2024-12-23 PROCEDURE — 80061 LIPID PANEL: CPT

## 2024-12-23 PROCEDURE — 36415 COLL VENOUS BLD VENIPUNCTURE: CPT

## 2025-06-13 ENCOUNTER — LAB VISIT (OUTPATIENT)
Dept: LAB | Facility: HOSPITAL | Age: 60
End: 2025-06-13
Attending: INTERNAL MEDICINE
Payer: COMMERCIAL

## 2025-06-13 DIAGNOSIS — E11.9 DIABETES MELLITUS WITHOUT COMPLICATION: ICD-10-CM

## 2025-06-13 DIAGNOSIS — R80.9 PROTEINURIA, UNSPECIFIED TYPE: Primary | ICD-10-CM

## 2025-06-13 DIAGNOSIS — E55.9 AVITAMINOSIS D: ICD-10-CM

## 2025-06-13 DIAGNOSIS — I51.9 MYXEDEMA HEART DISEASE: ICD-10-CM

## 2025-06-13 DIAGNOSIS — E03.9 MYXEDEMA HEART DISEASE: ICD-10-CM

## 2025-06-13 DIAGNOSIS — E78.00 PURE HYPERCHOLESTEROLEMIA: ICD-10-CM

## 2025-06-13 DIAGNOSIS — E55.9 VITAMIN D DEFICIENCY: ICD-10-CM

## 2025-06-13 LAB
25(OH)D3+25(OH)D2 SERPL-MCNC: 79 NG/ML (ref 30–80)
ALBUMIN SERPL-MCNC: 3.9 G/DL (ref 3.5–5)
ALBUMIN/GLOB SERPL: 1 RATIO (ref 1.1–2)
ALP SERPL-CCNC: 72 UNIT/L (ref 40–150)
ALT SERPL-CCNC: 21 UNIT/L (ref 0–55)
ANION GAP SERPL CALC-SCNC: 7 MEQ/L
AST SERPL-CCNC: 18 UNIT/L (ref 11–45)
BASOPHILS # BLD AUTO: 0.04 X10(3)/MCL
BASOPHILS NFR BLD AUTO: 0.8 %
BILIRUB SERPL-MCNC: 0.5 MG/DL
BUN SERPL-MCNC: 20 MG/DL (ref 8.4–25.7)
CALCIUM SERPL-MCNC: 9.1 MG/DL (ref 8.4–10.2)
CHLORIDE SERPL-SCNC: 110 MMOL/L (ref 98–107)
CHOLEST SERPL-MCNC: 222 MG/DL
CHOLEST/HDLC SERPL: 4 {RATIO} (ref 0–5)
CO2 SERPL-SCNC: 27 MMOL/L (ref 22–29)
CREAT SERPL-MCNC: 1.85 MG/DL (ref 0.72–1.25)
CREAT/UREA NIT SERPL: 11
EOSINOPHIL # BLD AUTO: 0.35 X10(3)/MCL (ref 0–0.9)
EOSINOPHIL NFR BLD AUTO: 6.6 %
ERYTHROCYTE [DISTWIDTH] IN BLOOD BY AUTOMATED COUNT: 14.1 % (ref 11.5–17)
EST. AVERAGE GLUCOSE BLD GHB EST-MCNC: 105.4 MG/DL
GFR SERPLBLD CREATININE-BSD FMLA CKD-EPI: 42 ML/MIN/1.73/M2
GLOBULIN SER-MCNC: 4 GM/DL (ref 2.4–3.5)
GLUCOSE SERPL-MCNC: 107 MG/DL (ref 74–100)
HBA1C MFR BLD: 5.3 %
HCT VFR BLD AUTO: 47.5 % (ref 42–52)
HDLC SERPL-MCNC: 51 MG/DL (ref 35–60)
HGB BLD-MCNC: 16 G/DL (ref 14–18)
IMM GRANULOCYTES # BLD AUTO: 0.01 X10(3)/MCL (ref 0–0.04)
IMM GRANULOCYTES NFR BLD AUTO: 0.2 %
LDLC SERPL CALC-MCNC: 147 MG/DL (ref 50–140)
LYMPHOCYTES # BLD AUTO: 1.51 X10(3)/MCL (ref 0.6–4.6)
LYMPHOCYTES NFR BLD AUTO: 28.5 %
MCH RBC QN AUTO: 31.8 PG (ref 27–31)
MCHC RBC AUTO-ENTMCNC: 33.7 G/DL (ref 33–36)
MCV RBC AUTO: 94.4 FL (ref 80–94)
MONOCYTES # BLD AUTO: 0.46 X10(3)/MCL (ref 0.1–1.3)
MONOCYTES NFR BLD AUTO: 8.7 %
NEUTROPHILS # BLD AUTO: 2.93 X10(3)/MCL (ref 2.1–9.2)
NEUTROPHILS NFR BLD AUTO: 55.2 %
NRBC BLD AUTO-RTO: 0 %
PLATELET # BLD AUTO: 232 X10(3)/MCL (ref 130–400)
PMV BLD AUTO: 9.3 FL (ref 7.4–10.4)
POTASSIUM SERPL-SCNC: 4.6 MMOL/L (ref 3.5–5.1)
PROT SERPL-MCNC: 7.9 GM/DL (ref 6.4–8.3)
RBC # BLD AUTO: 5.03 X10(6)/MCL (ref 4.7–6.1)
SODIUM SERPL-SCNC: 144 MMOL/L (ref 136–145)
TRIGL SERPL-MCNC: 121 MG/DL (ref 34–140)
TSH SERPL-ACNC: 3.76 UIU/ML (ref 0.35–4.94)
VLDLC SERPL CALC-MCNC: 24 MG/DL
WBC # BLD AUTO: 5.3 X10(3)/MCL (ref 4.5–11.5)

## 2025-06-13 PROCEDURE — 83036 HEMOGLOBIN GLYCOSYLATED A1C: CPT

## 2025-06-13 PROCEDURE — 36415 COLL VENOUS BLD VENIPUNCTURE: CPT

## 2025-06-13 PROCEDURE — 82306 VITAMIN D 25 HYDROXY: CPT

## 2025-06-13 PROCEDURE — 84443 ASSAY THYROID STIM HORMONE: CPT

## 2025-06-13 PROCEDURE — 80053 COMPREHEN METABOLIC PANEL: CPT

## 2025-06-13 PROCEDURE — 80061 LIPID PANEL: CPT

## 2025-06-13 PROCEDURE — 85025 COMPLETE CBC W/AUTO DIFF WBC: CPT

## 2025-06-16 DIAGNOSIS — Z87.891 HISTORY OF TOBACCO USE: Primary | ICD-10-CM

## 2025-06-16 DIAGNOSIS — R79.89 INCREASE IN CREATININE: Primary | ICD-10-CM

## 2025-06-23 ENCOUNTER — HOSPITAL ENCOUNTER (OUTPATIENT)
Dept: RADIOLOGY | Facility: HOSPITAL | Age: 60
Discharge: HOME | End: 2025-06-23
Attending: INTERNAL MEDICINE
Payer: COMMERCIAL

## 2025-06-23 DIAGNOSIS — Z12.2 SCREENING FOR MALIGNANT NEOPLASM OF RESPIRATORY ORGAN: ICD-10-CM

## 2025-06-23 PROCEDURE — 71271 CT THORAX LUNG CANCER SCR C-: CPT | Mod: TC

## 2025-06-27 ENCOUNTER — PATIENT MESSAGE (OUTPATIENT)
Dept: NEPHROLOGY | Facility: CLINIC | Age: 60
End: 2025-06-27
Payer: COMMERCIAL

## 2025-06-27 DIAGNOSIS — N18.32 STAGE 3B CHRONIC KIDNEY DISEASE: Primary | ICD-10-CM

## 2025-06-27 DIAGNOSIS — I10 PRIMARY HYPERTENSION: ICD-10-CM

## 2025-06-30 DIAGNOSIS — I10 PRIMARY HYPERTENSION: ICD-10-CM

## 2025-06-30 DIAGNOSIS — N18.32 STAGE 3B CHRONIC KIDNEY DISEASE: Primary | ICD-10-CM

## 2025-07-03 ENCOUNTER — LAB VISIT (OUTPATIENT)
Dept: LAB | Facility: HOSPITAL | Age: 60
End: 2025-07-03
Payer: COMMERCIAL

## 2025-07-03 DIAGNOSIS — N18.32 STAGE 3B CHRONIC KIDNEY DISEASE: ICD-10-CM

## 2025-07-03 DIAGNOSIS — I10 PRIMARY HYPERTENSION: ICD-10-CM

## 2025-07-03 LAB
ALBUMIN SERPL-MCNC: 4.3 G/DL (ref 3.5–5)
ALBUMIN/GLOB SERPL: 1.1 RATIO (ref 1.1–2)
ALP SERPL-CCNC: 61 UNIT/L (ref 40–150)
ALT SERPL-CCNC: 32 UNIT/L (ref 0–55)
ANION GAP SERPL CALC-SCNC: 6 MEQ/L
AST SERPL-CCNC: 22 UNIT/L (ref 11–45)
BILIRUB SERPL-MCNC: 1.1 MG/DL
BILIRUB UR QL STRIP.AUTO: NEGATIVE
BUN SERPL-MCNC: 19 MG/DL (ref 8.4–25.7)
CALCIUM SERPL-MCNC: 9.4 MG/DL (ref 8.4–10.2)
CHLORIDE SERPL-SCNC: 105 MMOL/L (ref 98–107)
CLARITY UR: CLEAR
CO2 SERPL-SCNC: 26 MMOL/L (ref 22–29)
COLOR UR AUTO: YELLOW
CREAT SERPL-MCNC: 1.28 MG/DL (ref 0.72–1.25)
CREAT UR-MCNC: 13.2 MG/DL (ref 63–166)
CREAT/UREA NIT SERPL: 15
GFR SERPLBLD CREATININE-BSD FMLA CKD-EPI: >60 ML/MIN/1.73/M2
GLOBULIN SER-MCNC: 3.8 GM/DL (ref 2.4–3.5)
GLUCOSE SERPL-MCNC: 100 MG/DL (ref 74–100)
GLUCOSE UR QL STRIP: NEGATIVE
HGB UR QL STRIP: NEGATIVE
KETONES UR QL STRIP: NEGATIVE
LEUKOCYTE ESTERASE UR QL STRIP: NEGATIVE
NITRITE UR QL STRIP: NEGATIVE
PH UR STRIP: 6.5 [PH]
POTASSIUM SERPL-SCNC: 4 MMOL/L (ref 3.5–5.1)
PROT SERPL-MCNC: 8.1 GM/DL (ref 6.4–8.3)
PROT UR QL STRIP: NEGATIVE
PROT UR STRIP-MCNC: <6.8 MG/DL
PTH-INTACT SERPL-MCNC: 56.5 PG/ML (ref 8.7–77)
SODIUM SERPL-SCNC: 137 MMOL/L (ref 136–145)
SP GR UR STRIP.AUTO: 1.01 (ref 1–1.03)
UROBILINOGEN UR STRIP-ACNC: 0.2

## 2025-07-03 PROCEDURE — 83970 ASSAY OF PARATHORMONE: CPT

## 2025-07-03 PROCEDURE — 80053 COMPREHEN METABOLIC PANEL: CPT

## 2025-07-03 PROCEDURE — 36415 COLL VENOUS BLD VENIPUNCTURE: CPT

## 2025-07-03 PROCEDURE — 81003 URINALYSIS AUTO W/O SCOPE: CPT

## 2025-07-03 PROCEDURE — 84156 ASSAY OF PROTEIN URINE: CPT

## 2025-07-07 ENCOUNTER — OFFICE VISIT (OUTPATIENT)
Dept: NEPHROLOGY | Facility: CLINIC | Age: 60
End: 2025-07-07
Payer: COMMERCIAL

## 2025-07-07 VITALS
TEMPERATURE: 99 F | HEIGHT: 67 IN | DIASTOLIC BLOOD PRESSURE: 70 MMHG | OXYGEN SATURATION: 97 % | SYSTOLIC BLOOD PRESSURE: 116 MMHG | RESPIRATION RATE: 19 BRPM | WEIGHT: 184.63 LBS | HEART RATE: 87 BPM | BODY MASS INDEX: 28.98 KG/M2

## 2025-07-07 DIAGNOSIS — N18.2 CKD (CHRONIC KIDNEY DISEASE) STAGE 2, GFR 60-89 ML/MIN: ICD-10-CM

## 2025-07-07 DIAGNOSIS — I10 ESSENTIAL (PRIMARY) HYPERTENSION: ICD-10-CM

## 2025-07-07 DIAGNOSIS — R79.89 INCREASE IN CREATININE: Primary | ICD-10-CM

## 2025-07-07 DIAGNOSIS — R10.9 ABDOMINAL PAIN, UNSPECIFIED ABDOMINAL LOCATION: ICD-10-CM

## 2025-07-07 DIAGNOSIS — N28.1 BILATERAL RENAL CYSTS: ICD-10-CM

## 2025-07-07 PROCEDURE — 1159F MED LIST DOCD IN RCRD: CPT | Mod: CPTII,S$GLB,,

## 2025-07-07 PROCEDURE — 99999 PR PBB SHADOW E&M-EST. PATIENT-LVL V: CPT | Mod: PBBFAC,,,

## 2025-07-07 PROCEDURE — 3044F HG A1C LEVEL LT 7.0%: CPT | Mod: CPTII,S$GLB,,

## 2025-07-07 PROCEDURE — 3066F NEPHROPATHY DOC TX: CPT | Mod: CPTII,S$GLB,,

## 2025-07-07 PROCEDURE — 3078F DIAST BP <80 MM HG: CPT | Mod: CPTII,S$GLB,,

## 2025-07-07 PROCEDURE — 3074F SYST BP LT 130 MM HG: CPT | Mod: CPTII,S$GLB,,

## 2025-07-07 PROCEDURE — 4010F ACE/ARB THERAPY RXD/TAKEN: CPT | Mod: CPTII,S$GLB,,

## 2025-07-07 PROCEDURE — 3008F BODY MASS INDEX DOCD: CPT | Mod: CPTII,S$GLB,,

## 2025-07-07 PROCEDURE — 99204 OFFICE O/P NEW MOD 45 MIN: CPT | Mod: S$GLB,,,

## 2025-07-07 RX ORDER — TEMAZEPAM 30 MG/1
30 CAPSULE ORAL NIGHTLY
COMMUNITY
Start: 2025-06-03

## 2025-07-07 RX ORDER — GARLIC 1000 MG
1000 CAPSULE ORAL DAILY
COMMUNITY

## 2025-07-07 RX ORDER — DIAZEPAM 10 MG/1
10 TABLET ORAL
COMMUNITY
Start: 2025-06-03

## 2025-07-07 RX ORDER — OMEPRAZOLE 40 MG/1
40 CAPSULE, DELAYED RELEASE ORAL DAILY
COMMUNITY

## 2025-07-07 RX ORDER — IBUPROFEN 100 MG/5ML
1000 SUSPENSION, ORAL (FINAL DOSE FORM) ORAL DAILY
COMMUNITY

## 2025-07-07 RX ORDER — AMLODIPINE BESYLATE 5 MG/1
5 TABLET ORAL DAILY
COMMUNITY
Start: 2025-06-16

## 2025-07-07 RX ORDER — ROSUVASTATIN CALCIUM 10 MG/1
5 TABLET, COATED ORAL EVERY OTHER DAY
COMMUNITY

## 2025-07-07 RX ORDER — BUPROPION HYDROCHLORIDE 300 MG/1
300 TABLET ORAL DAILY
COMMUNITY

## 2025-07-07 RX ORDER — TADALAFIL 20 MG/1
20 TABLET ORAL
COMMUNITY
Start: 2025-06-30

## 2025-07-07 RX ORDER — EZETIMIBE 10 MG/1
10 TABLET ORAL NIGHTLY
COMMUNITY
Start: 2025-06-16

## 2025-07-07 NOTE — PROGRESS NOTES
[facility] [place] [place]    HPI  Rm Flynn, 59 y.o. male, presents to office as a new patient for elevated creatinine.  It appears that GFR has maintained in the mid 50s since 2018.  History significant for hypertension diagnosed 5-10 years ago.  Otherwise no major medical history.  No history of diabetes, MI, CVA, PVD, major surgeries.  Patient does report moderate to heavy drinking weekly (1-2 fifths) and smoking 4-5 cigars weekly.  Denies any chronic NSAID usage.  BMI less than 30.  Denies weight lifting or ever taking creatine supplements.  He has had stress test with Dr. Avelar which was negative and he no longer follows with them.    A few weeks ago GFR 42 cc/minute.  PCP discontinued losartan.  Patient has also started increasing water intake to 7-8 bottles daily.  GFR now 65 cc/minute.    Mother with polycystic kidneys.  Brother with multiple bilateral cysts.           Medical Diagnoses:   Past Medical History:   Diagnosis Date    Disorder of kidney and ureter     GERD (gastroesophageal reflux disease)     High cholesterol     Hypertension      Problem List[1]    Surgical History:   History reviewed. No pertinent surgical history.    Family History:   Family History   Problem Relation Name Age of Onset    Kidney disease Mother      Hypertension Mother      Diabetes Mother      Cancer Father      Cancer Brother      Cancer Maternal Grandmother      Hypertension Paternal Grandmother      Cancer Paternal Grandmother         Social History:   Social History     Tobacco Use    Smoking status: Some Days     Current packs/day: 3.00     Average packs/day: 3.0 packs/day for 31.0 years (93.0 ttl pk-yrs)     Types: Cigars, Cigarettes    Smokeless tobacco: Never   Substance Use Topics    Alcohol use: Not on file       Allergies:  Review of patient's allergies indicates:   Allergen Reactions    Morphine Other (See Comments)     Other Reaction(s): Unknown       Medications:  Current Medications[2]  "      Review of Systems:    Constitutional: Denies fever, fatigue, generalized weakness  Skin: Denies wounds, no rashes, no itching, no new skin lesions  Respiratory:  Denies cough, shortness of breath, or wheezing  Cardiovascular: Denies chest pain, palpitations, or swelling  Gastrointestional: ++ some bilateral lower abdominal pain; some left flank pain with urination  Genitourinary: Denies dysuria, hematuria, foamy urine, or incontinence; reports able to empty bladder  Musculoskeletal: Denies back pain  Neurological: Denies headaches, dizziness, paresthesias, tremors or focal weakness      Vital Signs:  /70 (BP Location: Left arm, Patient Position: Sitting)   Pulse 87   Temp 98.8 °F (37.1 °C) (Temporal)   Resp 19   Ht 5' 6.93" (1.7 m)   Wt 83.7 kg (184 lb 9.6 oz)   SpO2 97%   BMI 28.97 kg/m²   Body mass index is 28.97 kg/m².      Physical Exam:    General: no acute distress, awake, alert  Eyes: conjunctiva clear, eyelids without swelling  HENT: atraumatic, oropharynx and nasal mucosa patent  Neck: supple, trache midline, full ROM, no JVD  Respiratory: equal, unlabored, clear to auscultation A/P  Cardiovascular: RRR without murmur or rub  Edema: none  Gastrointestinal: soft, non-tender, non-distended; positive bowel sounds; no masses to palpation; no ascites; no evidence of hernia  Musculoskeletal: ROM without new limitation or discomfort  Integumentary: warm, dry; no rashes, wounds, or skin lesions  Neurological: oriented x4, appropriate, no acute deficits; no asterixis      Labs:  Estimated Creatinine Clearance: 65.7 mL/min (A) (based on SCr of 1.28 mg/dL (H)).         Component Value Date/Time     07/03/2025 0936     06/13/2025 0823    K 4.0 07/03/2025 0936    K 4.6 06/13/2025 0823     07/03/2025 0936     (H) 06/13/2025 0823    CO2 26 07/03/2025 0936    CO2 27 06/13/2025 0823    BUN 19.0 07/03/2025 0936    BUN 20.0 06/13/2025 0823    CREATININE 1.28 (H) 07/03/2025 0936    " CREATININE 1.85 (H) 06/13/2025 0823    CREATININE 1.53 (H) 12/23/2024 0939    CREATININE 1.53 (H) 11/15/2024 1528    CALCIUM 9.4 07/03/2025 0936    CALCIUM 9.1 06/13/2025 0823    PHOS 3.1 11/15/2024 1528    PHOS 3.9 07/26/2024 0925    PTH 56.5 07/03/2025 0936           Component Value Date/Time    WBC 5.30 06/13/2025 0823    WBC 4.95 12/23/2024 0939    HGB 16.0 06/13/2025 0823    HGB 15.6 12/23/2024 0939    HCT 47.5 06/13/2025 0823    HCT 44.9 12/23/2024 0939     06/13/2025 0823     12/23/2024 0939         Imaging:  Retroperitoneal US:  8/2023  Bilateral renal cysts    Impression:    1. Increase in creatinine  Ambulatory referral/consult to Nephrology      2. CKD (chronic kidney disease) stage 2, GFR 60-89 ml/min        3. Essential (primary) hypertension          Appears patient has had CKD 3A at baseline.  Losartan discontinued by PCP 3-4 weeks ago and GFR now 65 cc/minute    Blood pressure well controlled.  Now on amlodipine 5 mg daily    Bilateral multiple renal cysts; reports genetic testing completed with last nephrologist.  Unsure of results; reports his mother does have polycystic kidneys and his brother also has multiple cysts.  He is unsure if type 1 or type 2    Discussed limiting alcohol intake as well as cigars    Discussed different treatment plans for polycystic kidney disease type 1 and type 2.  Discussed increasing water intake and low-sodium diet.    Recent CT chest did demonstrate bilateral renal cyst again as well as nephrolithiasis on left kidney; he is having some occasional left flank pain worse with urination    Noted previous microscopic hematuria.  Urine over the last year with no blood.  He has seen urologist in the past but no workup for hematuria.    No significant proteinuria    Plan:  Retroperitoneal ultrasound and abdominal ultrasound within the next couple of weeks to monitor renal cysts as well as patient complaints of bilateral lower abdominal pain  Otherwise follow  up in 8 weeks with repeat labs and urine    Request genetic test from previous nephrologist Dr. Maguire      Blood pressure goal: consistently less than 130/85      Avoid NSAIDs and COX2 inhibitors: Advil (ibuprofen), Aleve (naproxen), Mobic (meloxicam), Celebrex (celecoxib), Toradol (ketorolac) and Diclofenac (voltaren), Indomethacin (indocin).    Only take tylenol (acetaminophen) occasionally if needed for aches/pains.    Recommend low sodium diet:  Less than 2,000 mg per day  Avoid high salt foods (olives, pickles, smoked meats, deli meats, salted potato chips, fast food, etc.).   Do not add salt to your food at the table.   Use only small amounts of salt when cooking      Avoid alcohol and soda. Limit tea and coffee.     Stay well hydrated with water      KEYLA Gay      This note was created with the assistance of iZoca voice recognition software or phone dictation. There may be transcription errors as a result of using this technology however minimal. Effort has been made to assure accuracy of transcription but any obvious errors or omissions should be clarified with the author of the document.         [1]   Patient Active Problem List  Diagnosis    CKD (chronic kidney disease) stage 2, GFR 60-89 ml/min    Essential (primary) hypertension   [2]   Current Outpatient Medications:     amLODIPine (NORVASC) 5 MG tablet, Take 5 mg by mouth once daily., Disp: , Rfl:     ascorbic acid, vitamin C, (VITAMIN C) 1000 MG tablet, Take 1,000 mg by mouth once daily., Disp: , Rfl:     buPROPion (WELLBUTRIN XL) 300 MG 24 hr tablet, Take 300 mg by mouth once daily., Disp: , Rfl:     diazePAM (VALIUM) 10 MG Tab, Take 10 mg by mouth as needed., Disp: , Rfl:     ezetimibe (ZETIA) 10 mg tablet, Take 10 mg by mouth nightly., Disp: , Rfl:     garlic 1,000 mg Cap, Take 1,000 mg by mouth Daily., Disp: , Rfl:     melatonin 10 mg Chew, Take 5 mg by mouth as needed., Disp: , Rfl:     multivit-mins/iron/folic/lycop (CENTRUM  MEN ORAL), Take by mouth Daily., Disp: , Rfl:     omeprazole (PRILOSEC) 40 MG capsule, Take 40 mg by mouth Daily., Disp: , Rfl:     rosuvastatin (CRESTOR) 10 MG tablet, Take 5 mg by mouth every other day., Disp: , Rfl:     tadalafiL (CIALIS) 20 MG Tab, Take 20 mg by mouth as needed., Disp: , Rfl:     temazepam (RESTORIL) 30 mg capsule, Take 30 mg by mouth every evening., Disp: , Rfl:

## 2025-07-07 NOTE — PATIENT INSTRUCTIONS
Blood pressure goal: consistently less than 130/85    Avoid NSAIDs and COX2 inhibitors: Advil (ibuprofen), Aleve (naproxen), Mobic (meloxicam), Celebrex (celecoxib), Toradol (ketorolac) and Diclofenac (voltaren), Indomethacin (indocin).    Only take tylenol (acetaminophen) occasionally if needed for aches/pains.    Stay well hydrated with water: goal is around 100-120 ounces per day of pure water    Recommend low sodium diet:  Less than 1,800 mg per day  Avoid high salt foods (olives, pickles, smoked meats, deli meats, chips, fast food, etc.).   Do not add salt to your food at the table.   Use only small amounts of salt when cooking.    Renal Diet:  Reduce intake of nuts, peanut butter, milk, cheese, dried beans, and peas   Low protein intake, especially animal meat    Avoid alcohol, soda, and energy drinks. Limit tea and coffee.   Avoid smoking.    Call our office with concerns prior to next appointment.    ---------------------------------------------------------------------------------------------------------------------------------    CHRONIC KIDNEY DISEASE BASIC INFORMATION:    Your kidneys do many important jobs. Some of the ways they keep your whole body in balance include:  Removing natural waste products and extra water from your body  Helping make red blood cells  Balancing important minerals in your body  Helping maintain your blood pressure  Keeping your bones healthy    Chronic kidney disease (CKD) is when the kidneys have become damaged over time (for at least 3 months) and have a hard time doing all their important jobs. CKD also increases the risk of other health problems like heart disease and stroke. Developing CKD is usually a very slow process with very few symptoms at first.    Risk Factors  Anyone can develop CKD - at any age. However, some people are at a higher risk than others. The most common CKD risk factors are:  Diabetes  High blood pressure (hypertension)  Heart disease and/or heart  failure  Obesity  Over the age of 60  Family history of CKD or kidney failure  Personal history of acute kidney injury (GARCIA)  Smoking and/or use of tobacco products  Use of NSAIDs    For many people, CKD is not caused by just one reason. Instead, it is a result of many physical, environmental, and social factors.      For more education on kidney disease you can visit:  https://www.kidney.org/kidney-basics    https://www.kidney.org/kidney-topics/understanding-your-lab-values-and-other-ckd-health-numbers

## 2025-07-10 ENCOUNTER — TELEPHONE (OUTPATIENT)
Dept: NEPHROLOGY | Facility: CLINIC | Age: 60
End: 2025-07-10
Payer: COMMERCIAL

## 2025-07-10 NOTE — TELEPHONE ENCOUNTER
Second Attempt to call patient. No answer, left VM.  Patients next appt is 07/16 and lives in [place], [place].

## 2025-07-10 NOTE — TELEPHONE ENCOUNTER
Pt called back and wants to know why does he have to get swabbed for the genetic test again. He states that he does not mind coming in to get swabbed and tested if he has to. He just wants to know why.    Please advise. Rm [phone]    ---------------------------------------------------------------------------------------------------------------------    ----- Message from EL Gomez sent at 7/8/2025  1:26 PM CDT -----  JOSE ELIAS I just called patient. Left  to call me back. If he calls, please let me know.      If I am not available, we would like to repeat genetic testing if he is able to come in any time sooner than his FU to get the swab done.

## 2025-07-10 NOTE — TELEPHONE ENCOUNTER
----- Message from EL Gomez sent at 7/8/2025  1:26 PM CDT -----  JOSE ELIAS I just called patient. Left VM to call me back. If he calls, please let me know.     If I am not available, we would like to repeat genetic testing if he is able to come in any time sooner than his FU to get the swab done.

## 2025-07-16 ENCOUNTER — HOSPITAL ENCOUNTER (OUTPATIENT)
Dept: RADIOLOGY | Facility: HOSPITAL | Age: 60
Discharge: HOME | End: 2025-07-16
Payer: COMMERCIAL

## 2025-07-16 DIAGNOSIS — N28.1 BILATERAL RENAL CYSTS: ICD-10-CM

## 2025-07-16 DIAGNOSIS — R10.9 ABDOMINAL PAIN, UNSPECIFIED ABDOMINAL LOCATION: ICD-10-CM

## 2025-07-16 PROCEDURE — 76700 US EXAM ABDOM COMPLETE: CPT | Mod: TC

## 2025-07-16 PROCEDURE — 76770 US EXAM ABDO BACK WALL COMP: CPT | Mod: TC

## 2025-08-01 ENCOUNTER — LAB VISIT (OUTPATIENT)
Dept: LAB | Facility: HOSPITAL | Age: 60
End: 2025-08-01
Attending: INTERNAL MEDICINE
Payer: COMMERCIAL

## 2025-08-01 DIAGNOSIS — E29.9 DISORDER OF ENDOCRINE TESTIS: ICD-10-CM

## 2025-08-01 DIAGNOSIS — E63.9 NUTRITIONAL AND METABOLIC CARDIOMYOPATHY: Primary | ICD-10-CM

## 2025-08-01 DIAGNOSIS — E29.0 TESTICULAR HYPERFUNCTION: ICD-10-CM

## 2025-08-01 DIAGNOSIS — E88.9 NUTRITIONAL AND METABOLIC CARDIOMYOPATHY: Primary | ICD-10-CM

## 2025-08-01 DIAGNOSIS — I43 NUTRITIONAL AND METABOLIC CARDIOMYOPATHY: Primary | ICD-10-CM

## 2025-08-01 DIAGNOSIS — R80.9 PROTEINURIA, UNSPECIFIED TYPE: ICD-10-CM

## 2025-08-01 LAB
ANION GAP SERPL CALC-SCNC: 10 MEQ/L
BASOPHILS # BLD AUTO: 0.05 X10(3)/MCL
BASOPHILS NFR BLD AUTO: 0.9 %
BUN SERPL-MCNC: 11 MG/DL (ref 8.4–25.7)
CALCIUM SERPL-MCNC: 9.3 MG/DL (ref 8.4–10.2)
CHLORIDE SERPL-SCNC: 103 MMOL/L (ref 98–107)
CO2 SERPL-SCNC: 28 MMOL/L (ref 22–29)
CREAT SERPL-MCNC: 1.25 MG/DL (ref 0.72–1.25)
CREAT/UREA NIT SERPL: 9
EOSINOPHIL # BLD AUTO: 0.17 X10(3)/MCL (ref 0–0.9)
EOSINOPHIL NFR BLD AUTO: 3 %
ERYTHROCYTE [DISTWIDTH] IN BLOOD BY AUTOMATED COUNT: 13.1 % (ref 11.5–17)
GFR SERPLBLD CREATININE-BSD FMLA CKD-EPI: >60 ML/MIN/1.73/M2
GLUCOSE SERPL-MCNC: 92 MG/DL (ref 74–100)
HCT VFR BLD AUTO: 45.5 % (ref 42–52)
HGB BLD-MCNC: 15.8 G/DL (ref 14–18)
IMM GRANULOCYTES # BLD AUTO: 0.01 X10(3)/MCL (ref 0–0.04)
IMM GRANULOCYTES NFR BLD AUTO: 0.2 %
LYMPHOCYTES # BLD AUTO: 1.25 X10(3)/MCL (ref 0.6–4.6)
LYMPHOCYTES NFR BLD AUTO: 22 %
MCH RBC QN AUTO: 33.6 PG (ref 27–31)
MCHC RBC AUTO-ENTMCNC: 34.7 G/DL (ref 33–36)
MCV RBC AUTO: 96.8 FL (ref 80–94)
MONOCYTES # BLD AUTO: 0.49 X10(3)/MCL (ref 0.1–1.3)
MONOCYTES NFR BLD AUTO: 8.6 %
NEUTROPHILS # BLD AUTO: 3.7 X10(3)/MCL (ref 2.1–9.2)
NEUTROPHILS NFR BLD AUTO: 65.3 %
NRBC BLD AUTO-RTO: 0 %
PLATELET # BLD AUTO: 225 X10(3)/MCL (ref 130–400)
PMV BLD AUTO: 9.2 FL (ref 7.4–10.4)
POTASSIUM SERPL-SCNC: 4.4 MMOL/L (ref 3.5–5.1)
PSA SERPL-MCNC: 1 NG/ML
RBC # BLD AUTO: 4.7 X10(6)/MCL (ref 4.7–6.1)
SODIUM SERPL-SCNC: 141 MMOL/L (ref 136–145)
TESTOST SERPL-MCNC: >1500 NG/DL (ref 220.91–715.81)
WBC # BLD AUTO: 5.67 X10(3)/MCL (ref 4.5–11.5)

## 2025-08-01 PROCEDURE — 85025 COMPLETE CBC W/AUTO DIFF WBC: CPT

## 2025-08-01 PROCEDURE — 36415 COLL VENOUS BLD VENIPUNCTURE: CPT

## 2025-08-01 PROCEDURE — 84153 ASSAY OF PSA TOTAL: CPT

## 2025-08-01 PROCEDURE — 80048 BASIC METABOLIC PNL TOTAL CA: CPT

## 2025-08-01 PROCEDURE — 84403 ASSAY OF TOTAL TESTOSTERONE: CPT

## 2025-08-04 ENCOUNTER — TELEPHONE (OUTPATIENT)
Dept: NEPHROLOGY | Facility: CLINIC | Age: 60
End: 2025-08-04
Payer: COMMERCIAL

## 2025-08-04 NOTE — TELEPHONE ENCOUNTER
Patient states that he is worried now that appt was changed with Ramonita. He wants to know if you can call him

## 2025-08-04 NOTE — TELEPHONE ENCOUNTER
----- Message from EL Gomez sent at 8/4/2025  1:00 PM CDT -----  Hey I already sent a message on this last week but quinn where it went..    can you call this patient to find out if he will be back in town the first week of September? I know anastacio is blocked that week but he can see him sept 2-4th in the afternoon if patient can come then. He needs to see MD.  ----- Message -----  From: Nate Luu FNP  Sent: 7/30/2025   8:00 AM CDT  To: EL Valenzuela    Genetic test result    Schedule FU with Anastacio